# Patient Record
Sex: FEMALE | Race: WHITE | URBAN - METROPOLITAN AREA
[De-identification: names, ages, dates, MRNs, and addresses within clinical notes are randomized per-mention and may not be internally consistent; named-entity substitution may affect disease eponyms.]

---

## 2021-11-17 ENCOUNTER — OFFICE VISIT (OUTPATIENT)
Dept: URBAN - METROPOLITAN AREA CLINIC 44 | Facility: CLINIC | Age: 76
End: 2021-11-17
Payer: MEDICARE

## 2021-11-17 DIAGNOSIS — H40.031 ANATOMICAL NARROW ANGLE, RIGHT EYE: Primary | ICD-10-CM

## 2021-11-17 PROCEDURE — 99204 OFFICE O/P NEW MOD 45 MIN: CPT | Performed by: OPHTHALMOLOGY

## 2021-11-17 PROCEDURE — CRBAL CREDIT BALANCE: CUSTOM | Performed by: OPHTHALMOLOGY

## 2021-11-17 ASSESSMENT — KERATOMETRY
OD: 45.00
OS: 45.88

## 2021-11-17 ASSESSMENT — INTRAOCULAR PRESSURE
OS: 21
OD: 20

## 2021-11-17 ASSESSMENT — VISUAL ACUITY
OD: 20/25
OS: 20/50

## 2021-11-17 NOTE — IMPRESSION/PLAN
Impression: Anatomical narrow angle, right eye: H40.031.  Plan: Glaucoma consultation next available

## 2021-11-17 NOTE — IMPRESSION/PLAN
Impression: Age-related nuclear cataract, right eye: H25.11. Plan:  NON dilated exam today :Patient will continue to monitor vision. Patient will return for CEE or if notes any sudden changes in vision or loss of vision.

## 2021-11-20 ENCOUNTER — OFFICE VISIT (OUTPATIENT)
Dept: URBAN - METROPOLITAN AREA CLINIC 51 | Facility: CLINIC | Age: 76
End: 2021-11-20
Payer: MEDICARE

## 2021-11-20 DIAGNOSIS — H25.11 AGE-RELATED NUCLEAR CATARACT, RIGHT EYE: Primary | ICD-10-CM

## 2021-11-20 DIAGNOSIS — H53.9 VISUAL DISTURBANCE: ICD-10-CM

## 2021-11-20 DIAGNOSIS — H04.123 TEAR FILM INSUFFICIENCY OF BILATERAL LACRIMAL GLANDS: ICD-10-CM

## 2021-11-20 PROCEDURE — 92014 COMPRE OPH EXAM EST PT 1/>: CPT | Performed by: OPHTHALMOLOGY

## 2021-11-20 ASSESSMENT — VISUAL ACUITY
OD: 20/30
OS: 20/80

## 2021-11-20 ASSESSMENT — INTRAOCULAR PRESSURE
OS: 18
OD: 18

## 2021-11-20 ASSESSMENT — KERATOMETRY
OD: 45.38
OS: 46.02

## 2021-11-20 NOTE — IMPRESSION/PLAN
Impression: Visual disturbance: H53.9. Plan: CONSIDER refractive consultation for left eye after retinal evaluation, prn treatment.

## 2021-11-20 NOTE — IMPRESSION/PLAN
Impression: Puckering of macula, left eye: H35.372. Plan: Reviewed with patient . .. Patient will contact the office immediately  if notes any new  vision changes. 


RETINAL TEAM CONSULTATION

## 2021-11-20 NOTE — IMPRESSION/PLAN
Impression: Age-related nuclear cataract, right eye: H25.11. Plan: Discussed cataract with patient. Discussed treatment options. Surgical treatment is recommended.   

Consultation with Dr. Karly Jarrell after retinal team consultation

## 2021-11-20 NOTE — IMPRESSION/PLAN
Impression: Anatomical narrow angle, right eye: H40.031. Plan: Narrow angles are noted. .... . due to increased risk of narrow angle glaucoma patient was educated regarding the signs and symptoms of narrow angle attack.

## 2022-02-15 ENCOUNTER — OFFICE VISIT (OUTPATIENT)
Dept: URBAN - METROPOLITAN AREA CLINIC 10 | Facility: CLINIC | Age: 77
End: 2022-02-15
Payer: MEDICARE

## 2022-02-15 DIAGNOSIS — H35.372 PUCKERING OF MACULA, LEFT EYE: Primary | ICD-10-CM

## 2022-02-15 PROCEDURE — 99214 OFFICE O/P EST MOD 30 MIN: CPT | Performed by: OPHTHALMOLOGY

## 2022-02-15 PROCEDURE — 92134 CPTRZ OPH DX IMG PST SGM RTA: CPT | Performed by: OPHTHALMOLOGY

## 2022-02-15 ASSESSMENT — INTRAOCULAR PRESSURE
OS: 13
OD: 13

## 2022-02-15 NOTE — IMPRESSION/PLAN
Impression: Visual disturbance: H53.9.  Plan: CONSIDER refractive consultation for MONOVISION REVERSAL -- see other plan

## 2022-02-15 NOTE — IMPRESSION/PLAN
Impression: Puckering of macula, left Plan: LEFT eye -- distortion and metamorph noted by pt. ERM folds / macular pucker / distortion w CME . This is affecting vision. OS is MONOVISION NEAR. Pt is conservative re surgery but w active symptoms and future considering REVERSING MONOVISION w LASIK or IOL exchange, she WANTS TO REMOVE THIS ISSUE OF ERM and CME variable. Epiretinal membrane (ERM), significant w edema / distortion. VA affected. Long d/w pt, education. Prognosis guarded, wks / mo's recovery post op needed for BCVA and even w surgx, VA does not return to nml. Determined proceed w Vitrectom: VIT / ERM /ILM peel w STTA and partial AFx 48h chin down OK to travel to [facility] after but would be STEPWISE travel. Post op OPTOS -- steroid injx may be required. Travel to elevation after surgery d/t Gas will be Stepwise (like SCUBA-diver coming from depths, must do equilibration safety stops every 1500 ft in elevation rise for at least 30min, keeping nose down during travel. If at any time headache, loss of vision or pain, must turn around and descend elevation again til symptoms resolve, wait 60 min before attempt to gain elevation again).

## 2022-03-10 ENCOUNTER — ADULT PHYSICAL (OUTPATIENT)
Dept: URBAN - METROPOLITAN AREA CLINIC 44 | Facility: CLINIC | Age: 77
End: 2022-03-10
Payer: MEDICARE

## 2022-03-10 DIAGNOSIS — Z01.818 ENCOUNTER FOR OTHER PREPROCEDURAL EXAMINATION: Primary | ICD-10-CM

## 2022-03-10 PROCEDURE — 99203 OFFICE O/P NEW LOW 30 MIN: CPT | Performed by: PHYSICIAN ASSISTANT

## 2023-01-17 ENCOUNTER — OFFICE VISIT (OUTPATIENT)
Dept: URBAN - METROPOLITAN AREA CLINIC 51 | Facility: CLINIC | Age: 78
End: 2023-01-17
Payer: MEDICARE

## 2023-01-17 DIAGNOSIS — H35.372 PUCKERING OF MACULA, LEFT EYE: Primary | ICD-10-CM

## 2023-01-17 DIAGNOSIS — H43.813 VITREOUS DEGENERATION, BILATERAL: ICD-10-CM

## 2023-01-17 PROCEDURE — 99214 OFFICE O/P EST MOD 30 MIN: CPT | Performed by: OPHTHALMOLOGY

## 2023-01-17 PROCEDURE — 92134 CPTRZ OPH DX IMG PST SGM RTA: CPT | Performed by: OPHTHALMOLOGY

## 2023-01-17 RX ORDER — OFLOXACIN 3 MG/ML
0.3 % SOLUTION/ DROPS OPHTHALMIC
Qty: 5 | Refills: 0 | Status: INACTIVE
Start: 2023-01-17 | End: 2023-01-23

## 2023-01-17 RX ORDER — PREDNISOLONE ACETATE 10 MG/ML
1 % SUSPENSION/ DROPS OPHTHALMIC
Qty: 5 | Refills: 0 | Status: ACTIVE
Start: 2023-01-17

## 2023-01-17 ASSESSMENT — INTRAOCULAR PRESSURE
OS: 13
OD: 12

## 2023-01-17 NOTE — IMPRESSION/PLAN
Impression: Puckering of macula, left eye: H35.372. Plan: moderate ERM, visually significant, recommend surgical intervention. disc r/b/a's, limited BCVA despite successful repair. pt elects to proceed RTC PPV MP OS
25 mins [id number]

## 2023-03-22 ENCOUNTER — ADULT PHYSICAL (OUTPATIENT)
Dept: URBAN - METROPOLITAN AREA CLINIC 51 | Facility: CLINIC | Age: 78
End: 2023-03-22
Payer: MEDICARE

## 2023-03-22 DIAGNOSIS — Z01.818 ENCOUNTER FOR OTHER PREPROCEDURAL EXAMINATION: Primary | ICD-10-CM

## 2023-03-22 DIAGNOSIS — H43.813 VITREOUS DEGENERATION, BILATERAL: ICD-10-CM

## 2023-03-22 PROCEDURE — 99213 OFFICE O/P EST LOW 20 MIN: CPT | Performed by: PHYSICIAN ASSISTANT

## 2023-03-28 ENCOUNTER — POST-OPERATIVE VISIT (OUTPATIENT)
Dept: URBAN - METROPOLITAN AREA CLINIC 51 | Facility: CLINIC | Age: 78
End: 2023-03-28
Payer: MEDICARE

## 2023-03-28 DIAGNOSIS — Z48.810 ENCOUNTER FOR SURGICAL AFTERCARE FOLLOWING SURGERY ON A SENSE ORGAN: Primary | ICD-10-CM

## 2023-03-28 PROCEDURE — 99024 POSTOP FOLLOW-UP VISIT: CPT | Performed by: OPTOMETRIST

## 2023-03-28 ASSESSMENT — INTRAOCULAR PRESSURE: OS: 14

## 2023-03-28 NOTE — IMPRESSION/PLAN
Impression:  Encounter for surgical aftercare following surgery on a sense organ  Z48.810. Plan: s/p ppvit AT 
[facility] will resolve 
use pred/ ocuflox

## 2023-04-11 ENCOUNTER — OFFICE VISIT (OUTPATIENT)
Dept: URBAN - METROPOLITAN AREA CLINIC 51 | Facility: CLINIC | Age: 78
End: 2023-04-11
Payer: MEDICARE

## 2023-04-11 DIAGNOSIS — H35.372 PUCKERING OF MACULA, LEFT EYE: Primary | ICD-10-CM

## 2023-04-11 DIAGNOSIS — G24.5 BLEPHAROSPASM: ICD-10-CM

## 2023-04-11 PROCEDURE — 99024 POSTOP FOLLOW-UP VISIT: CPT | Performed by: OPHTHALMOLOGY

## 2023-04-11 PROCEDURE — 92134 CPTRZ OPH DX IMG PST SGM RTA: CPT | Performed by: OPHTHALMOLOGY

## 2023-04-11 ASSESSMENT — INTRAOCULAR PRESSURE
OD: 7
OS: 7

## 2023-04-11 NOTE — IMPRESSION/PLAN
Impression: Puckering of macula, left eye: H35.372. Plan: s/p PPV MP OS, ERM resolved. doing well. monitor RTC 3-4months

ok for new mrx in 6-8 weeks

## 2023-04-11 NOTE — IMPRESSION/PLAN
Impression: Blepharospasm: G24.5.  Plan: hx of botox injections, would like to establish with doctor here

## 2023-05-23 ENCOUNTER — OFFICE VISIT (OUTPATIENT)
Dept: URBAN - METROPOLITAN AREA CLINIC 51 | Facility: CLINIC | Age: 78
End: 2023-05-23
Payer: MEDICARE

## 2023-05-23 DIAGNOSIS — H25.11 AGE-RELATED NUCLEAR CATARACT, RIGHT EYE: ICD-10-CM

## 2023-05-23 DIAGNOSIS — H52.4 PRESBYOPIA: ICD-10-CM

## 2023-05-23 DIAGNOSIS — H35.372 PUCKERING OF MACULA, LEFT EYE: Primary | ICD-10-CM

## 2023-05-23 PROCEDURE — 99213 OFFICE O/P EST LOW 20 MIN: CPT | Performed by: OPTOMETRIST

## 2023-05-23 ASSESSMENT — VISUAL ACUITY
OS: 20/60
OD: 20/25

## 2023-05-23 ASSESSMENT — KERATOMETRY
OD: 45.00
OS: 46.38

## 2023-05-23 NOTE — IMPRESSION/PLAN
Impression: Puckering of macula, left eye: H35.372. Plan: s/p PPV MP OS, ERM resolved.  
will limit BCVA OS

## 2023-05-23 NOTE — IMPRESSION/PLAN
Impression: Presbyopia: H52.4.  Plan: recommend try glasses prior to proceeding with surgery OD
emphasized BCVA limited by retina OS

## 2023-05-23 NOTE — IMPRESSION/PLAN
Impression: Age-related nuclear cataract, right eye: H25.11. Plan: monitor for changes in vision. 
consider cataract surgery OD in the future

## 2023-12-19 ENCOUNTER — OFFICE VISIT (OUTPATIENT)
Dept: URBAN - METROPOLITAN AREA CLINIC 51 | Facility: CLINIC | Age: 78
End: 2023-12-19
Payer: MEDICARE

## 2023-12-19 DIAGNOSIS — H35.372 PUCKERING OF MACULA, LEFT EYE: Primary | ICD-10-CM

## 2023-12-19 DIAGNOSIS — H52.4 PRESBYOPIA: ICD-10-CM

## 2023-12-19 PROCEDURE — 92134 CPTRZ OPH DX IMG PST SGM RTA: CPT | Performed by: OPHTHALMOLOGY

## 2023-12-19 PROCEDURE — 99214 OFFICE O/P EST MOD 30 MIN: CPT | Performed by: OPHTHALMOLOGY

## 2023-12-19 ASSESSMENT — INTRAOCULAR PRESSURE
OD: 10
OS: 11

## 2024-10-30 ENCOUNTER — OFFICE VISIT (OUTPATIENT)
Dept: URBAN - METROPOLITAN AREA CLINIC 51 | Facility: CLINIC | Age: 79
End: 2024-10-30
Payer: MEDICARE

## 2024-10-30 DIAGNOSIS — H35.372 PUCKERING OF MACULA, LEFT EYE: ICD-10-CM

## 2024-10-30 DIAGNOSIS — G24.5 BLEPHAROSPASM: ICD-10-CM

## 2024-10-30 DIAGNOSIS — H25.11 AGE-RELATED NUCLEAR CATARACT, RIGHT EYE: ICD-10-CM

## 2024-10-30 DIAGNOSIS — H04.123 TEAR FILM INSUFFICIENCY OF BILATERAL LACRIMAL GLANDS: ICD-10-CM

## 2024-10-30 DIAGNOSIS — H52.4 PRESBYOPIA: ICD-10-CM

## 2024-10-30 DIAGNOSIS — R73.09 ABNORMAL GLUCOSE: Primary | ICD-10-CM

## 2024-10-30 PROCEDURE — 92014 COMPRE OPH EXAM EST PT 1/>: CPT | Performed by: OPTOMETRIST

## 2024-10-30 PROCEDURE — 92134 CPTRZ OPH DX IMG PST SGM RTA: CPT | Performed by: OPTOMETRIST

## 2024-10-30 RX ORDER — NEOMYCIN SULFATE, POLYMYXIN B SULFATE AND DEXAMETHASONE 3.5; 10000; 1 MG/G; [USP'U]/G; MG/G
OINTMENT OPHTHALMIC
Qty: 3.5 | Refills: 1 | Status: ACTIVE
Start: 2024-10-30

## 2024-10-30 ASSESSMENT — KERATOMETRY
OD: 45.25
OS: 45.75

## 2024-10-30 ASSESSMENT — VISUAL ACUITY
OD: 20/25
OS: 20/50

## 2024-10-30 ASSESSMENT — INTRAOCULAR PRESSURE
OD: 15
OS: 13